# Patient Record
Sex: MALE | Race: WHITE | NOT HISPANIC OR LATINO | URBAN - METROPOLITAN AREA
[De-identification: names, ages, dates, MRNs, and addresses within clinical notes are randomized per-mention and may not be internally consistent; named-entity substitution may affect disease eponyms.]

---
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
## 2022-08-12 NOTE — PROGRESS NOTES
New bilateral Shoulders    Patient: Chandler Gabriel        YOB: 2000    Medical Record Number: [id number]        Chief Complaints: bilateral shoulders    History of Present Illness: This is a 22-year-old right-hand-dominant male who presents complaining of bilateral shoulder pain he played collegiate tennis and he feels like the right is really overuse he feels like the left 1 started with lifting.  The been ongoing for several months he is tried rest he is tried some anti-inflammatories he has backed off his activities.  He is now working full-time so has a little less time for tennis.  His symptoms are moderate intermittent worse with activity and worse with certain lifting somewhat better with rest he does have mild night pain his past medical history is unremarkable      Allergies: No Known Allergies    Medications:   Home Medications:  No current outpatient medications on file prior to visit.     No current facility-administered medications on file prior to visit.     Current Medications:  Scheduled Meds:  Continuous Infusions:No current facility-administered medications for this visit.    PRN Meds:.    History reviewed. No pertinent past medical history.   History reviewed. No pertinent surgical history.     Social History     Occupational History   • Not on file   Tobacco Use   • Smoking status: Never Smoker   • Smokeless tobacco: Never Used   Vaping Use   • Vaping Use: Never used   Substance and Sexual Activity   • Alcohol use: Not Currently   • Drug use: Defer   • Sexual activity: Defer      Social History     Social History Narrative   • Not on file        Family History   Problem Relation Age of Onset   • Heart attack Paternal Grandfather              Review of Systems:     Review of Systems      Physical Exam: 22 y.o. male  General Appearance:    Alert, cooperative, in no acute distress                   Vitals:    08/16/22 1624   Temp: 97.8 °F (36.6 °C)   Weight: 71.4 kg (157 lb  "6.4 oz)   Height: 170.2 cm (67\")   PainSc:   6      Patient is alert and read ×3 no acute distress appears her above-listed at height weight and age.  Affect is normal respiratory rate is normal unlabored. Heart rate regular rate rhythm, sclera, dentition and hearing are normal for the purpose of this exam.    Ortho Exam  .[email or web address]  Physical exam of the right shoulder reveals no overlying skin changes no lymphedema no lymphadenopathy.  Patient has active flexion 180 with mild symptoms abduction is similar external rotation is to 50 and internal rotation to the upper lumbar spine with mild symptoms.  Patient has good rotator cuff strength 4+ over 5 with isometric strength testing with pain.  Patient has a positive impingement and a positive Ghotra sign.  Patient has good cervical range of motion which is full and asymptomatic no radicular symptoms.  Patient has a normal elbow exam.  Good distal pulses are present  Patient has pain with overhead activity and a positive Neer sign and a positive empty can sign , a positive drop arm and a definitive painful arc    Exam of his left shoulder he has full range of motion all directions he is quite tight in his posterior capsule he does have pain with stressing of his biceps tendon mild symptoms with stressing of his rotator cuff but good strength muscle bulk throughout he really has pretty good scapular stabilization  Procedures          Radiology:   AP, Scapular Y and Axillary Lateral of the right and left shoulder were ordered/reviewed to evauate shoulder pain.  I have no comparative films these are normal  Imaging Results (Most Recent)     Procedure Component Value Units Date/Time    XR Shoulder 2+ View Bilateral [[id number]] Resulted: 08/16/22 1619     Updated: 08/16/22 1619    Impression:      Ordering physician's impression is located in the Encounter Note dated 08/16/22. X-ray performed in the DR room.          Assessment/Plan: Bilateral shoulder pain I do agree " with him I think this is more impingement on the right on the left I think it is more biceps tendon plan is to start him on some meloxicam with strict precautions and then I want him to see physical therapy to really work on that posterior capsule and the biceps tendon if he fails to improve with this would pursue an MRI of the more symptomatic 1

## 2022-08-16 ENCOUNTER — OFFICE VISIT (OUTPATIENT)
Dept: ORTHOPEDIC SURGERY | Facility: CLINIC | Age: 22
End: 2022-08-16

## 2022-08-16 VITALS — TEMPERATURE: 97.8 F | HEIGHT: 67 IN | BODY MASS INDEX: 24.71 KG/M2 | WEIGHT: 157.4 LBS

## 2022-08-16 DIAGNOSIS — R52 PAIN: Primary | ICD-10-CM

## 2022-08-16 DIAGNOSIS — M75.22 BICEPS TENDINITIS OF LEFT UPPER EXTREMITY: ICD-10-CM

## 2022-08-16 DIAGNOSIS — M75.40 IMPINGEMENT SYNDROME OF SHOULDER REGION, UNSPECIFIED LATERALITY: ICD-10-CM

## 2022-08-16 PROCEDURE — 73030 X-RAY EXAM OF SHOULDER: CPT | Performed by: ORTHOPAEDIC SURGERY

## 2022-08-16 PROCEDURE — 99204 OFFICE O/P NEW MOD 45 MIN: CPT | Performed by: ORTHOPAEDIC SURGERY

## 2022-08-16 RX ORDER — MELOXICAM 15 MG/1
TABLET ORAL
Qty: 30 TABLET | Refills: 0 | Status: SHIPPED | OUTPATIENT
Start: 2022-08-16

## 2022-08-16 RX ORDER — IBUPROFEN 200 MG
200 TABLET ORAL EVERY 6 HOURS PRN
COMMUNITY
End: 2022-08-16 | Stop reason: ALTCHOICE